# Patient Record
Sex: MALE | Race: WHITE | NOT HISPANIC OR LATINO | Employment: OTHER | URBAN - METROPOLITAN AREA
[De-identification: names, ages, dates, MRNs, and addresses within clinical notes are randomized per-mention and may not be internally consistent; named-entity substitution may affect disease eponyms.]

---

## 2018-03-28 ENCOUNTER — OFFICE VISIT (OUTPATIENT)
Dept: FAMILY MEDICINE CLINIC | Facility: CLINIC | Age: 63
End: 2018-03-28

## 2018-03-28 VITALS
DIASTOLIC BLOOD PRESSURE: 84 MMHG | HEART RATE: 94 BPM | WEIGHT: 166 LBS | TEMPERATURE: 98.1 F | SYSTOLIC BLOOD PRESSURE: 122 MMHG | BODY MASS INDEX: 22.48 KG/M2 | HEIGHT: 72 IN | OXYGEN SATURATION: 98 %

## 2018-03-28 DIAGNOSIS — M67.911 ROTATOR CUFF DISORDER, RIGHT: Primary | ICD-10-CM

## 2018-03-28 PROCEDURE — 99213 OFFICE O/P EST LOW 20 MIN: CPT | Performed by: FAMILY MEDICINE

## 2018-03-28 NOTE — PROGRESS NOTES
"Subjective   Kulwinder Rome is a 62 y.o. male.     Chief Complaint   Patient presents with   • Shoulder Pain     right shoulder pain  after a fall around feb 1st        History of Present Illness    Non-work-related fall.  Almost 2 months ago.  For a few days he had some fairly moderate to significant right shoulder pain with limited got better pretty fast.  Then he had some ongoing discomfort in the right shoulder for about a month or so.  It's getting much better now he states.  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  He's had some discomfort in the forearm recently. Perhaps a pinched nerve.  His wife wanted him to be evaluated.  Overall he states he's doing much better.  No other injury.      The following portions of the patient's history were reviewed and updated as appropriate: allergies, current medications, past family history, past medical history, past social history, past surgical history and problem list.          Review of Systems   Constitutional: Negative.    Musculoskeletal: Negative for joint swelling.   Neurological: Negative for weakness and numbness.   Psychiatric/Behavioral: Negative.        Objective   Blood pressure 122/84, pulse 94, temperature 98.1 °F (36.7 °C), temperature source Oral, height 182.9 cm (72.01\"), weight 75.3 kg (166 lb), SpO2 98 %.  Physical Exam   Constitutional: No distress.   HENT:   Head: Atraumatic.   Musculoskeletal:   Right shoulder.  Mildly decreased internal/external rotation.  Some pain with both.  Good abduction.  Strength 5 out of 5 in the deltoid.  Also good strength with internal/external rotation.  No pain over the bicipital groove.  He has no pain with resisted wrist flexion or extension at the medial and lateral epicondyles.       Assessment/Plan   Kulwinder was seen today for shoulder pain.    Diagnoses and all orders for this visit:    Rotator cuff disorder, right      Posttraumatic non-work-related rotator cuff inflammation, mild impingement " syndrome.  Discussed and demonstrated rehabilitation exercises today.  Patient declined physical therapy referral.  This time there is no indication for x-ray, likely no fracture.  Questionable mild intermittent epicondylitis.  He overall is improving.  If the symptoms get worse or persistently bother him, let us know we can refer to physical therapy and or sports medicine.

## 2018-03-28 NOTE — PATIENT INSTRUCTIONS
Shoulder Impingement Syndrome Rehab  Ask your health care provider which exercises are safe for you. Do exercises exactly as told by your health care provider and adjust them as directed. It is normal to feel mild stretching, pulling, tightness, or discomfort as you do these exercises, but you should stop right away if you feel sudden pain or your pain gets worse. Do not begin these exercises until told by your health care provider.  Stretching and range of motion exercise  This exercise warms up your muscles and joints and improves the movement and flexibility of your shoulder. This exercise also helps to relieve pain and stiffness.  Exercise A: Passive horizontal adduction     1. Sit or stand and pull your left / right elbow across your chest, toward your other shoulder. Stop when you feel a gentle stretch in the back of your shoulder and upper arm.  ¨ Keep your arm at shoulder height.  ¨ Keep your arm as close to your body as you comfortably can.  2. Hold for __________ seconds.  3. Slowly return to the starting position.  Repeat __________ times. Complete this exercise __________ times a day.  Strengthening exercises  These exercises build strength and endurance in your shoulder. Endurance is the ability to use your muscles for a long time, even after they get tired.  Exercise B: External rotation, isometric   1. Stand or sit in a doorway, facing the door frame.  2. Bend your left / right elbow and place the back of your wrist against the door frame. Only your wrist should be touching the frame. Keep your upper arm at your side.  3. Gently press your wrist against the door frame, as if you are trying to push your arm away from your abdomen.  ¨ Avoid shrugging your shoulder while you press your hand against the door frame. Keep your shoulder blade tucked down toward the middle of your back.  4. Hold for __________ seconds.  5. Slowly release the tension, and relax your muscles completely before you do the  exercise again.  Repeat __________ times. Complete this exercise __________ times a day.  Exercise C: Internal rotation, isometric     1. Stand or sit in a doorway, facing the door frame.  2. Bend your left / right elbow and place the inside of your wrist against the door frame. Only your wrist should be touching the frame. Keep your upper arm at your side.  3. Gently press your wrist against the door frame, as if you are trying to push your arm toward your abdomen.  ¨ Avoid shrugging your shoulder while you press your hand against the door frame. Keep your shoulder blade tucked down toward the middle of your back.  4. Hold for __________ seconds.  5. Slowly release the tension, and relax your muscles completely before you do the exercise again.  Repeat __________ times. Complete this exercise __________ times a day.  Exercise D: Scapular protraction, supine     1. Lie on your back on a firm surface. Hold a __________ weight in your left / right hand.  2. Raise your left / right arm straight into the air so your hand is directly above your shoulder joint.  3. Push the weight into the air so your shoulder lifts off of the surface that you are lying on. Do not move your head, neck, or back.  4. Hold for __________ seconds.  5. Slowly return to the starting position. Let your muscles relax completely before you repeat this exercise.  Repeat __________ times. Complete this exercise __________ times a day.  Exercise E: Scapular retraction     1. Sit in a stable chair without armrests, or stand.  2. Secure an exercise band to a stable object in front of you so the band is at shoulder height.  3. Hold one end of the exercise band in each hand. Your palms should face down.  4. Squeeze your shoulder blades together and move your elbows slightly behind you. Do not shrug your shoulders while you do this.  5. Hold for __________ seconds.  6. Slowly return to the starting position.  Repeat __________ times. Complete this  exercise __________ times a day.  Exercise F: Shoulder extension     1. Sit in a stable chair without armrests, or stand.  2. Secure an exercise band to a stable object in front of you where the band is above shoulder height.  3. Hold one end of the exercise band in each hand.  4. Straighten your elbows and lift your hands up to shoulder height.  5. Squeeze your shoulder blades together and pull your hands down to the sides of your thighs. Stop when your hands are straight down by your sides. Do not let your hands go behind your body.  6. Hold for __________ seconds.  7. Slowly return to the starting position.  Repeat __________ times. Complete this exercise __________ times a day.  This information is not intended to replace advice given to you by your health care provider. Make sure you discuss any questions you have with your health care provider.  Document Released: 12/18/2006 Document Revised: 08/24/2017 Document Reviewed: 11/19/2016  [employer]vier Interactive Patient Education © 2017 [employer]

## 2022-03-22 ENCOUNTER — OFFICE VISIT (OUTPATIENT)
Dept: GASTROENTEROLOGY | Facility: CLINIC | Age: 67
End: 2022-03-22

## 2022-03-22 ENCOUNTER — TELEPHONE (OUTPATIENT)
Dept: GASTROENTEROLOGY | Facility: CLINIC | Age: 67
End: 2022-03-22

## 2022-03-22 VITALS — HEIGHT: 72 IN | WEIGHT: 158 LBS | BODY MASS INDEX: 21.4 KG/M2 | TEMPERATURE: 98.3 F

## 2022-03-22 DIAGNOSIS — D12.2 ADENOMATOUS POLYP OF ASCENDING COLON: ICD-10-CM

## 2022-03-22 DIAGNOSIS — K59.01 SLOW TRANSIT CONSTIPATION: Primary | ICD-10-CM

## 2022-03-22 PROCEDURE — 99204 OFFICE O/P NEW MOD 45 MIN: CPT | Performed by: INTERNAL MEDICINE

## 2022-03-22 NOTE — TELEPHONE ENCOUNTER
JEREMIAH patient in office for colonoscopy. Scheduled 07/14/2022 with arrival time 10:30am. Prep packet handed to patient. Also advised arrival time may vary based on [facility] guidelines. JEREMIAH Love--Gianluca

## 2022-03-22 NOTE — PROGRESS NOTES
Chief Complaint   Patient presents with   • Constipation   • Rectal Pain     Subjective     HPI  Kulwinder Rome is a 66 y.o. male who presents today for new office visit  Patient with rectal pain and a rectal lump near the anus for quite some time  The symptoms wax and wane  No rectal bleeding  History of colon polyp removed tubular adenoma 6 years ago  No upper GI symptoms  No weight loss    Objective   Vitals:    03/22/22 1011   Temp: 98.3 °F (36.8 °C)       Physical Exam  Vitals reviewed.   Constitutional:       Appearance: He is well-developed.   HENT:      Head: Normocephalic and atraumatic.   Abdominal:      General: Bowel sounds are normal. There is no distension.      Palpations: Abdomen is soft. There is no mass.      Tenderness: There is no abdominal tenderness.      Hernia: No hernia is present.   Skin:     General: Skin is warm and dry.   Neurological:      Mental Status: He is alert and oriented to person, place, and time.   Psychiatric:         Behavior: Behavior normal.         Thought Content: Thought content normal.         Judgment: Judgment normal.       Rectal exam revealed a small external hemorrhoid nonthrombosed nonprolapsed at the 3 o'clock position      The following data was reviewed by: Soy José MD on 03/22/2022:    Colonoscopy 2016 with removal of 1 tubular adenoma, internal hemorrhoids were present    Assessment/Plan   Assessment:     Sternal hemorrhoid  Colon polyp  Constipation      Plan:   Colace 50 mg p.o. twice daily  Analpram cream provided for patient to be taken nightly for a month  Schedule colonoscopy    I spent 40 minutes caring for Kulwinder on this date of service. This time includes time spent by me in the following activities:preparing for the visit, reviewing tests, obtaining and/or reviewing a separately obtained history, performing a medically appropriate examination and/or evaluation , counseling and educating the patient/family/caregiver, ordering medications,  tests, or procedures, referring and communicating with other health care professionals , documenting information in the medical record, independently interpreting results and communicating that information with the patient/family/caregiver and care coordination    Soy José MD  [facility], [facility]  [address] [place] [address]  Office: [phone]

## 2022-07-13 RX ORDER — ASPIRIN 325 MG
650 TABLET ORAL EVERY 6 HOURS PRN
COMMUNITY

## 2022-07-13 RX ORDER — MULTIPLE VITAMINS W/ MINERALS TAB 9MG-400MCG
1 TAB ORAL DAILY
COMMUNITY

## 2022-07-14 ENCOUNTER — ANESTHESIA EVENT (OUTPATIENT)
Dept: GASTROENTEROLOGY | Facility: HOSPITAL | Age: 67
End: 2022-07-14

## 2022-07-14 ENCOUNTER — ANESTHESIA (OUTPATIENT)
Dept: GASTROENTEROLOGY | Facility: HOSPITAL | Age: 67
End: 2022-07-14

## 2022-07-14 ENCOUNTER — HOSPITAL ENCOUNTER (OUTPATIENT)
Facility: HOSPITAL | Age: 67
Setting detail: HOSPITAL OUTPATIENT SURGERY
Discharge: HOME | End: 2022-07-14
Attending: INTERNAL MEDICINE | Admitting: INTERNAL MEDICINE

## 2022-07-14 ENCOUNTER — TELEPHONE (OUTPATIENT)
Dept: GASTROENTEROLOGY | Facility: CLINIC | Age: 67
End: 2022-07-14

## 2022-07-14 VITALS
DIASTOLIC BLOOD PRESSURE: 60 MMHG | HEIGHT: 72 IN | WEIGHT: 154 LBS | OXYGEN SATURATION: 100 % | SYSTOLIC BLOOD PRESSURE: 124 MMHG | HEART RATE: 72 BPM | BODY MASS INDEX: 20.86 KG/M2 | RESPIRATION RATE: 16 BRPM

## 2022-07-14 PROCEDURE — G0105 COLORECTAL SCRN; HI RISK IND: HCPCS | Performed by: INTERNAL MEDICINE

## 2022-07-14 PROCEDURE — 25010000002 PROPOFOL 10 MG/ML EMULSION: Performed by: ANESTHESIOLOGY

## 2022-07-14 PROCEDURE — S0260 H&P FOR SURGERY: HCPCS | Performed by: INTERNAL MEDICINE

## 2022-07-14 RX ORDER — PROPOFOL 10 MG/ML
VIAL (ML) INTRAVENOUS CONTINUOUS PRN
Status: DISCONTINUED | OUTPATIENT
Start: 2022-07-14 | End: 2022-07-14 | Stop reason: SURG

## 2022-07-14 RX ORDER — LIDOCAINE HYDROCHLORIDE 20 MG/ML
INJECTION, SOLUTION INFILTRATION; PERINEURAL AS NEEDED
Status: DISCONTINUED | OUTPATIENT
Start: 2022-07-14 | End: 2022-07-14 | Stop reason: SURG

## 2022-07-14 RX ORDER — SODIUM CHLORIDE 0.9 % (FLUSH) 0.9 %
10 SYRINGE (ML) INJECTION EVERY 12 HOURS SCHEDULED
Status: DISCONTINUED | OUTPATIENT
Start: 2022-07-14 | End: 2022-07-14 | Stop reason: HOSPADM

## 2022-07-14 RX ORDER — PROPOFOL 10 MG/ML
VIAL (ML) INTRAVENOUS AS NEEDED
Status: DISCONTINUED | OUTPATIENT
Start: 2022-07-14 | End: 2022-07-14 | Stop reason: SURG

## 2022-07-14 RX ORDER — SODIUM CHLORIDE 0.9 % (FLUSH) 0.9 %
10 SYRINGE (ML) INJECTION AS NEEDED
Status: DISCONTINUED | OUTPATIENT
Start: 2022-07-14 | End: 2022-07-14 | Stop reason: HOSPADM

## 2022-07-14 RX ORDER — SODIUM CHLORIDE, SODIUM LACTATE, POTASSIUM CHLORIDE, CALCIUM CHLORIDE 600; 310; 30; 20 MG/100ML; MG/100ML; MG/100ML; MG/100ML
30 INJECTION, SOLUTION INTRAVENOUS CONTINUOUS PRN
Status: DISCONTINUED | OUTPATIENT
Start: 2022-07-14 | End: 2022-07-14 | Stop reason: HOSPADM

## 2022-07-14 RX ADMIN — LIDOCAINE HYDROCHLORIDE 60 MG: 20 INJECTION, SOLUTION INFILTRATION; PERINEURAL at 11:19

## 2022-07-14 RX ADMIN — PROPOFOL 120 MG: 10 INJECTION, EMULSION INTRAVENOUS at 11:19

## 2022-07-14 RX ADMIN — SODIUM CHLORIDE, POTASSIUM CHLORIDE, SODIUM LACTATE AND CALCIUM CHLORIDE 30 ML/HR: 600; 310; 30; 20 INJECTION, SOLUTION INTRAVENOUS at 11:12

## 2022-07-14 RX ADMIN — Medication 200 MCG/KG/MIN: at 11:19

## 2022-07-14 NOTE — H&P
[facility]  Pre Procedure History & Physical    Chief Complaint:   Time for my colonoscopy    Subjective     HPI:   67 y.o. male presenting to endoscopy unit today for surveillance colonoscopy.    Past Medical History:   Past Medical History:   Diagnosis Date   • Adenomatous polyp of ascending colon 03/22/2022   • ADHD    • Anxiety and depression    • BPH (benign prostatic hyperplasia)    • Colon polyp 2016   • Dermatofibrosarcoma    • GERD (gastroesophageal reflux disease)    • History of mononucleosis    • Malignant neoplasm of skin    • Meningitis     history as a child   • Sleep apnea     no machine       Family History:  Family History   Problem Relation Age of Onset   • Cancer Mother         kidney   • Heart disease Father    • Cancer Brother    • Colon cancer Brother    • Malig Hyperthermia Neg Hx        Social History:   reports that he has never smoked. He has never used smokeless tobacco. He reports current alcohol use of about 21.0 standard drinks of alcohol per week. He reports that he does not use drugs.    Medications:   Medications Prior to Admission   Medication Sig Dispense Refill Last Dose   • amphetamine-dextroamphetamine (ADDERALL) 10 MG tablet Take 10 mg by mouth 2 (two) times a day.   7/14/2022 at Unknown time   • aspirin 325 MG tablet Take 650 mg by mouth Every 6 (Six) Hours As Needed for Mild Pain , Headache or Fever.   Past Week at Unknown time   • DULoxetine (CYMBALTA) 60 MG capsule Take 1 capsule by mouth Every Morning.   7/14/2022 at Unknown time   • MAGNESIUM PO Take 1 tablet by mouth Daily.   Past Week at Unknown time   • multivitamin with minerals tablet tablet Take 1 tablet by mouth Daily.   Past Week at Unknown time   • Zolpidem Tartrate 10 MG sublingual tablet Take 1 tablet by mouth Daily.   Past Week at Unknown time       Allergies:  Pollen extract    ROS:    Pertinent items are noted in HPI     Objective     Blood pressure 130/75, pulse 81, resp. rate 12,  "height 182.9 cm (72\"), weight 69.9 kg (154 lb), SpO2 99 %.    Physical Exam   Constitutional: Pt is oriented to person, place, and time and well-developed, well-nourished, and in no distress.   Abdominal: Soft.   Psychiatric: Mood, memory, affect and judgment normal.     Assessment & Plan     Diagnosis:  Personal hx of colon polyps    Anticipated Surgical Procedure:  Colonoscopy    The risks, benefits, and alternatives of this procedure have been discussed with the patient or the responsible party- the patient understands and agrees to proceed.                                                                "

## 2022-07-14 NOTE — ANESTHESIA PREPROCEDURE EVALUATION
Anesthesia Evaluation     Patient summary reviewed and Nursing notes reviewed                Airway   Mallampati: II  TM distance: >3 FB  Neck ROM: full  No difficulty expected  Dental - normal exam     Pulmonary - normal exam   (+) sleep apnea,     ROS comment: No CPAP  Cardiovascular - normal exam        Neuro/Psych  (+) psychiatric history ADHD, Anxiety and Depression,      ROS Comment: Hx meningitis as a child  GI/Hepatic/Renal/Endo    (+)  GERD,      Musculoskeletal     Abdominal  - normal exam   Substance History      OB/GYN          Other      history of cancer      Other Comment: Skin CA (dermatofibrosarcoma)                Anesthesia Plan    ASA 2     MAC     intravenous induction     Anesthetic plan, risks, benefits, and alternatives have been provided, discussed and informed consent has been obtained with: patient.        CODE STATUS:

## 2022-07-14 NOTE — ANESTHESIA POSTPROCEDURE EVALUATION
Patient: Kulwinder Rome    Procedure Summary     Date: 07/14/22 Room / Location:  [facility]    Anesthesia Start: 1115 Anesthesia Stop: 1137    Procedure: COLONOSCOPY TO CECUM (N/A ) Diagnosis:       Slow transit constipation      Adenomatous polyp of ascending colon      (Slow transit constipation [K59.01])      (Adenomatous polyp of ascending colon [D12.2])    Surgeons: Soy José MD Provider: Blair Lakhani MD    Anesthesia Type: MAC ASA Status: 2          Anesthesia Type: MAC    Vitals  No vitals data found for the desired time range.          Post Anesthesia Care and Evaluation    Patient location during evaluation: PHASE II  Patient participation: complete - patient participated  Level of consciousness: awake and alert  Pain management: adequate    Airway patency: patent  Anesthetic complications: No anesthetic complications  PONV Status: none  Cardiovascular status: acceptable and hemodynamically stable  Respiratory status: acceptable, nonlabored ventilation and spontaneous ventilation  Hydration status: acceptable

## 2022-07-14 NOTE — TELEPHONE ENCOUNTER
Sandee (on [facility]) and reports that the pt take cymbalta and has not taken it today and feels sweaty. His procedure is today and she is asking if pt can take with a sip of water.  Advised pt can take with a sip of water. Verb understanding.

## 2022-07-14 NOTE — DISCHARGE INSTRUCTIONS
WHAT ARE DIVERTICULOSIS AND DIVERTICULITIS?  Many people have small pouches in their colons that bulge outward through weak spots, like an inner tube that pokes through weak places in a tire.  Each pouch is called a diverticulum.  The condition of having diverticula is DIVERTICULOSIS.  The condition becomes more common as people age.  About half of all people over the age of 60 have diverticulosis    When pouches become infected or inflamed, the condition is called DIVERTICULITIS.  This happens in 10% to 25% of people with diverticulosis.  Diverticulosis and diverticulitis are also called DIVERTICULAR DISEASE.     WHAT ARE THE SYMPTOMS?  Diverticulosis - Most people do not have any discomfort or symptoms.  However, symptoms may include mild cramps, bloating, and constipation.  Other diseases such as irritable bowel syndrome (IBS) and stomach ulcers cause similar problems, so these symptoms do not always mean a person has diverticulosis.  You should visit your doctor if you have these troubling symptoms.    Diverticulitis - The most common symptom is abdominal pain.  The most common sign is tenderness around the left side of the lower abdomen.  If infection is the cause, fever, nausea, vomiting, chills, cramping, and constipation may occur as well.  The severity depends on the extent of the infection and complications.    WHAT ARE THE COMPLICATIONS?  Diverticulitis can lead to bleeding, infections,perforations or tears, or blockages.  These complications always require treatment to prevent them from proggressing and causing serous illness.    Bleeding from a diverticula is a rare complication.  When this occurs, blood may appear in the toilet or in your stool.  Bleeding can be severe, but it may stop by itself and not require treatment.  Doctors believe bleeding diverticula are caused by a small blood vessel in a diverticulum that weakens and finally bursts.  If you have bleeding from the rectum, you should see your  doctor.  If the bleeding does not stop you may need surgery.    Abscess, Perforation, and Peritonitis - The infection causing diverticulitis often clears up after a few days of treatment with antibiotics.  If the condition gets worse, an abscess may form in the colon.  An abscess is an infected area with pus that may cause swelling and destroy tissue.  Sometimes the infected diverticula may develop small holes, called perforations.  These perforations allow pus to leak out of the colon into the abdominal area.  If the abscess is small and remains in the colon, it may clear up after treatment with antibiotics.  If not, the doctor may need to drain it.  A large abscess can become a serious problem if the infection leaks out and contaminates areas outside the colon.  Infection that spreads into the abdominal cavity is called peritonitis.  Peritonitis requires immediate surgery toclean the abdominal cavity and remove the damaged part of the colon.  Without surgery, peritonitis can be fatal.    FISTULA  A fistula is an abnormal connection of tissue between two organs or between an organ and the skin.  When damaged tissues come into contact with each other during infection, they sometimes stick together.  If they heal that way, a fistula forms.  When diverticulitis-related infection spreads through out the colon, the colon's tissue may stick to nearby tissues.  The organs usually involved are the bladder, small intestine, and skin.  The problem can be corrected with surgery to remove the fistula and affected part of the colon.    INTESTINAL OBSTRUCTION  The scarring caused by infection may cause partial or total blockage of the large intestine.  When this happens, the colon is unable to move bowel contents normally.  When the obstruction totally blocks the intestine, emergency surgery is necessary.  Partial blockage is not an emergency, so the surgery to correct it can be planned.    WHAT CAUSES DIVERTICULAR  DISEASE  Although not proven, the dominant theory is that a low-fiber diet is the main cause of diverticular disease.  The disease was first noticed in the [place] in the early 1900s.  At about the same time, processed foods were introduced into the American diet.  Many processed foods contain refined, low-fiber flour.  Unlike whole-wheat flour, refined flour has no wheat bran.    Diverticular disease is common in developed or industrialized countries-particularly the [place], [place], and [place]-where low-fiber diets are common.  The disease is rare in countries of [place], where people eat high-fiber vegetable diets.    Fiber is the part of fruits, vegetables, and whole grains that the body cannot digest.  Some fiber dissolves easily in water (soluble fiber).  It takes on a soft, jelly-like texture in the intestines.  Some fiber passes almost unchanged through the intestines (insoluble fiber).  Both kinds of fiber help make stools soft and easy to pass.  Fiber also prevents constipation.    Constipation makes the muscles strain to move stool that is too hard.  It is the main cause of increased pressure in the colon.  This excess pressure might cause the weak spots in the colon to bulge out and become diverticula.  Diverticulitis occurs when diverticula become infected or inflamed.  Doctors are not certain what causes the infection.  It may begin when stool or bacteria are caught in the diverticula.  An attack of diverticulitis can develop suddenly and without warning.    HOW DOES THE DOCTOR DIAGNOSE DIVERTICULAR DISEASE  The doctor asks about medical history, does a physical exam, and may perform one or more diagnostic tests.  Because most people do not have symptoms, diverticulosis is often found through tests ordered for another ailment.    When taking a medical history, the doctor may ask about bowel habits, symptoms, pain, diet, and medications.  The physical exam usually involves a  digital rectal exam.  To preform this test. The doctor inserts a gloved, lubricated finger into the rectum to detect tenderness, blockage, or blood.  The doctor may check stool for signs of bleeding and test blood for signs of infection.  The doctor may also order x-rays or other tests.    WHAT IS THE TREATMENT FOR DIVERTICULAR DISEASE  Increasing the amount of fiber in the diet may reduce symptoms of diverticulosis and prevent complications such as diverticulitis.  Fiber keeps stool soft and lowers pressure inside the colon so that bowel contents can move through easily.  The American Dietetic Association. Recommends 20 to 35 grams of fiber each day.  The doctor may also recommend taking a fiber product such as Citrucel or Metamucil once a dya.  These products are mixed water and provide about 2 to 3.5 grams of fiber per  Tablespoon, mixed with 8 ounces of water.    Avoidance of nuts, popcorn, and sunflower, pumpkin, eladio, and sesame seeds has been recommended by physicians out of fear that food particles could enter, block, or irritate the diverticula.  However, no scientific data support this treatment measure.  Eating a high-fiber diet is the only requirement highly emphasized across the medical literature.  Eliminating specific foods is not necessary.  The seeds in tomatoes, zucchini, cucumbers, strawberries, and raspberries, as well as poppy seeds, are generally considered harmless.  People differ in amounts and types of foods the can eat.  Decisions about diet should be made based on what works best for each person.  Keeping a food diary may help identify what foods may cause symptoms.    If cramps, bloating, and constipation are problems, the doctor may prescribe  Short course of pain medication.  However, many medications affect emptying of the colon, an undesirable side effect for people with diverticulosis.    DIVERTICULITIS  Treatment focuses on clearing up the infection and inflammation, resting the  colon, and preventing or minimizing complications.  An attack of diverticulitis without complications may respond to antibiotics within a few days if treated early.  To help the colon rest, the doctor may recommend bed rest and a liquid diet, along with a pain reliever.    An acute attack with severe pain or sever infection may require a hospital stay.  Most acute cases of diverticulitis are treated with antibiotics and a liquid diet.  The antibiotics are given by injection into a vein.  In some cases, however, surgery may be necessary.    WHEN IS SURGERY NECESSARY  If attacks are severe or frequent, the doctor may advise surgery.  The surgeon removes the affected part of the colon and joins the remaining sections.  This typed of surgery, called colon resection, aims to keep attacks from coming back and to prevent complications.  The doctor may also recommend surgery for complications of a fistula or intestinal obstruction.    If antibiotics do not correct an attack, emergency surgery may be required.  Other reasons for emergency surgery include a large abscess, perforation, peritonitis, or continued bleeding.    Emergency surgery usually involves 2 operations.  The first will clear the infected abdominal cavity and remove part of the colon.  Because infection and sometimes obstruction, it is not safe to rejoin the colon during the first operation.  Instead, the surgeon creates a temporary hole, or stoma, in the abdomen.  The end of the colon is connected to the hole, a procedure called a colostomy, to allow normal eating and bowel movements.  The stool goes into a bag attached to the opening in the abdomen.  In the second operation, the surgeon rejoins the ends of the colon.

## 2023-09-28 ENCOUNTER — HOSPITAL ENCOUNTER (EMERGENCY)
Facility: HOSPITAL | Age: 68
Discharge: HOME | End: 2023-09-28
Attending: EMERGENCY MEDICINE
Payer: MEDICARE

## 2023-09-28 VITALS
BODY MASS INDEX: 20.18 KG/M2 | OXYGEN SATURATION: 99 % | RESPIRATION RATE: 16 BRPM | HEART RATE: 84 BPM | WEIGHT: 149 LBS | DIASTOLIC BLOOD PRESSURE: 63 MMHG | HEIGHT: 72 IN | TEMPERATURE: 98.5 F | SYSTOLIC BLOOD PRESSURE: 144 MMHG

## 2023-09-28 DIAGNOSIS — M79.10 MYALGIA: ICD-10-CM

## 2023-09-28 DIAGNOSIS — N30.01 ACUTE CYSTITIS WITH HEMATURIA: Primary | ICD-10-CM

## 2023-09-28 DIAGNOSIS — R30.0 DYSURIA: ICD-10-CM

## 2023-09-28 LAB
BACTERIA UR QL AUTO: ABNORMAL /HPF
BILIRUB UR QL STRIP: NEGATIVE
CLARITY UR: ABNORMAL
COLOR UR: ABNORMAL
GLUCOSE UR STRIP-MCNC: NEGATIVE MG/DL
HGB UR QL STRIP.AUTO: ABNORMAL
HYALINE CASTS UR QL AUTO: ABNORMAL /LPF
KETONES UR QL STRIP: NEGATIVE
LEUKOCYTE ESTERASE UR QL STRIP.AUTO: ABNORMAL
NITRITE UR QL STRIP: NEGATIVE
PH UR STRIP.AUTO: 6.5 [PH] (ref 4.5–8)
PROT UR QL STRIP: NEGATIVE
RBC # UR STRIP: ABNORMAL /HPF
REF LAB TEST METHOD: ABNORMAL
SP GR UR STRIP: 1.01 (ref 1–1.03)
SQUAMOUS #/AREA URNS HPF: ABNORMAL /HPF
UROBILINOGEN UR QL STRIP: ABNORMAL
WBC # UR STRIP: ABNORMAL /HPF

## 2023-09-28 PROCEDURE — 99282 EMERGENCY DEPT VISIT SF MDM: CPT

## 2023-09-28 PROCEDURE — 87186 SC STD MICRODIL/AGAR DIL: CPT | Performed by: EMERGENCY MEDICINE

## 2023-09-28 PROCEDURE — 87088 URINE BACTERIA CULTURE: CPT | Performed by: EMERGENCY MEDICINE

## 2023-09-28 PROCEDURE — 81001 URINALYSIS AUTO W/SCOPE: CPT | Performed by: EMERGENCY MEDICINE

## 2023-09-28 PROCEDURE — 87086 URINE CULTURE/COLONY COUNT: CPT | Performed by: EMERGENCY MEDICINE

## 2023-09-28 RX ORDER — CEPHALEXIN 500 MG/1
500 CAPSULE ORAL EVERY 12 HOURS
Qty: 14 CAPSULE | Refills: 0 | Status: SHIPPED | OUTPATIENT
Start: 2023-09-28 | End: 2023-10-05

## 2023-09-28 NOTE — ED PROVIDER NOTES
Subjective   History of Present Illness  Patient presents complaining of frequency and dysuria.  Patient denies any blood in his urine.  No previous episodes.  Patient also says last week he had body aches, backaches, fevers, and general malaise.  He said this week it is gotten better.  Patient says that he did not know if he could have some sort of mosquito borne illness or tickborne illness and did know if he should be tested.  Patient denies any rash or lesions.  Patient has not traveled outside of [place].  No therapy taken prior to arrival.  Nothing seems to make it better or worse.  Patient is otherwise healthy and not having any respiratory distress.    Review of Systems   All other systems reviewed and are negative.    Past Medical History:   Diagnosis Date    Adenomatous polyp of ascending colon 03/22/2022    ADHD     Anxiety and depression     BPH (benign prostatic hyperplasia)     Colon polyp 2016    Dermatofibrosarcoma     GERD (gastroesophageal reflux disease)     History of mononucleosis     Malignant neoplasm of skin     Meningitis     history as a child    Sleep apnea     no machine       Allergies   Allergen Reactions    Pollen Extract Unknown (See Comments)       Past Surgical History:   Procedure Laterality Date    COLONOSCOPY N/A 03/22/2016    Procedure: COLONOSCOPY WITH COLD POLYPECTOMIES, COLD BIOPSIES;  Surgeon: Soy José MD;  Location: [facility] ENDOSCOPY;  Service:     COLONOSCOPY N/A 7/14/2022    Procedure: COLONOSCOPY TO CECUM;  Surgeon: Soy José MD;  Location: [facility] ENDOSCOPY;  Service: Gastroenterology;  Laterality: N/A;  PRE- H/O POLYPS  POST- DIVERTICULOSIS, HEMORRHOIDS    ENDOSCOPY N/A 03/22/2016    Procedure: ESOPHAGOGASTRODUODENOSCOPY WITH COLD BIOPSIES;  Surgeon: Soy José MD;  Location: [facility] ENDOSCOPY;  Service:     OTHER SURGICAL HISTORY Right     PROTRUSION REMOVED FROM RT SHOULDER    SINUS SURGERY  2017    TONSILLECTOMY         Family History   Problem  Relation Age of Onset    Cancer Mother         kidney    Heart disease Father     Cancer Brother     Colon cancer Brother     Malig Hyperthermia Neg Hx        Social History     Socioeconomic History    Marital status: Single   Tobacco Use    Smoking status: Never    Smokeless tobacco: Never   Vaping Use    Vaping Use: Never used   Substance and Sexual Activity    Alcohol use: Yes     Alcohol/week: 21.0 standard drinks     Types: 21 Cans of beer per week    Drug use: Never    Sexual activity: Not Currently     Partners: Female           Objective   Physical Exam  Vitals and nursing note reviewed.   Constitutional:       Appearance: Normal appearance.      Comments: Patient sitting in bed comfortably.  Pleasant, talkative.   HENT:      Head: Normocephalic.      Right Ear: External ear normal.      Left Ear: External ear normal.      Nose: Nose normal.      Mouth/Throat:      Mouth: Mucous membranes are moist.      Pharynx: Oropharynx is clear.   Eyes:      Conjunctiva/sclera: Conjunctivae normal.   Cardiovascular:      Rate and Rhythm: Normal rate and regular rhythm.      Heart sounds: Normal heart sounds.   Pulmonary:      Effort: Pulmonary effort is normal.      Breath sounds: Normal breath sounds.   Abdominal:      General: Abdomen is flat.      Palpations: Abdomen is soft.   Musculoskeletal:         General: No swelling. Normal range of motion.      Cervical back: Neck supple.   Lymphadenopathy:      Cervical: No cervical adenopathy.   Skin:     General: Skin is warm and dry.      Capillary Refill: Capillary refill takes 2 to 3 seconds.      Findings: No rash.   Neurological:      Mental Status: He is alert and oriented to person, place, and time.   Psychiatric:         Mood and Affect: Mood normal.         Behavior: Behavior normal.       Procedures           ED Course                                           Medical Decision Making  Ddx UTI, viral illness, urinary retention, pyelonephritis    Labs  Reviewed  URINALYSIS W/ CULTURE IF INDICATED - Abnormal; Notable for the following components:     Appearance, UA                  (*)                  Blood, UA                       (*)                  Leuk Esterase, UA             Large (3+) (*)            All other components within normal limits         Narrative: In absence of clinical symptoms, the presence of pyuria, bacteria, and/or nitrites on the urinalysis result does not correlate with infection.  URINALYSIS, MICROSCOPIC ONLY - Abnormal; Notable for the following components:     RBC, UA                       13-20 (*)               WBC, UA                         (*)                  Bacteria, UA                  1+ (*)              All other components within normal limits  URINE CULTURE    1153 Pt seen again prior to d/c.  Labs reviewed and are remarkable for possible early uti. Pt. in NAD. Non-toxic. Comfortable. Ambulating without difficulty.  Tolerating po.  Relaxed breathing.  All questions personally answered at the bedside and all d/c instructions personally reviewed with pt.  Discussed the importance of close outpt. f/u and pt. understands this and agrees to do so.  Pt agrees to return to ED immediately for any new, persistent, or worsening symptoms.    EMR Dragon/Transcription disclaimer:  Much of this encounter note is an electronic transcription/translation of spoken language to printed text, aka voice recognition.  The electronic translation of spoken language may permit erroneous or at times nonsensical words or phrases to be inadvertently transcribed; although I have reviewed the note for such errors, some may still exist so please interpret based on surrounding text content.          Final diagnoses:   Acute cystitis with hematuria   Dysuria   Myalgia       ED Disposition  ED Disposition       ED Disposition   Discharge    Condition   Stable    Comment   --               [facility]  [address] [place]  [address]  [phone]  In 3 days  If symptoms worsen    PATIENT CONNECTION - [address] [place] [address]  [phone]  In 3 days  If symptoms worsen         Medication List        New Prescriptions      cephalexin 500 MG capsule  Commonly known as: KEFLEX  Take 1 capsule by mouth Every 12 (Twelve) Hours for 7 days.               Where to Get Your Medications        These medications were sent to [facility] [id number] - [facility] [address] AT [address] - [phone]  - [phone]   [address], [facility] [address]      Phone: [phone]   cephalexin 500 MG capsule            Remy Rocha MD  09/28/23 8126

## 2023-09-30 LAB — BACTERIA SPEC AEROBE CULT: ABNORMAL

## (undated) DEVICE — TUBING, SUCTION, 1/4" X 10', STRAIGHT: Brand: MEDLINE

## (undated) DEVICE — KT ORCA ORCAPOD DISP STRL

## (undated) DEVICE — ADAPT CLN BIOGUARD AIR/H2O DISP

## (undated) DEVICE — SENSR O2 OXIMAX FNGR A/ 18IN NONSTR

## (undated) DEVICE — LN SMPL CO2 SHTRM SD STREAM W/M LUER

## (undated) DEVICE — CANN O2 ETCO2 FITS ALL CONN CO2 SMPL A/ 7IN DISP LF